# Patient Record
Sex: FEMALE | Race: WHITE | Employment: UNEMPLOYED | URBAN - METROPOLITAN AREA
[De-identification: names, ages, dates, MRNs, and addresses within clinical notes are randomized per-mention and may not be internally consistent; named-entity substitution may affect disease eponyms.]

---

## 2018-01-19 PROCEDURE — 88175 CYTOPATH C/V AUTO FLUID REDO: CPT | Performed by: OBSTETRICS & GYNECOLOGY

## 2018-01-19 PROCEDURE — 87624 HPV HI-RISK TYP POOLED RSLT: CPT | Performed by: OBSTETRICS & GYNECOLOGY

## 2019-02-08 PROCEDURE — 88305 TISSUE EXAM BY PATHOLOGIST: CPT | Performed by: OBSTETRICS & GYNECOLOGY

## 2019-06-15 ENCOUNTER — OFFICE VISIT (OUTPATIENT)
Dept: FAMILY MEDICINE CLINIC | Facility: CLINIC | Age: 44
End: 2019-06-15
Payer: COMMERCIAL

## 2019-06-15 VITALS
RESPIRATION RATE: 16 BRPM | HEIGHT: 66 IN | BODY MASS INDEX: 25.71 KG/M2 | OXYGEN SATURATION: 98 % | DIASTOLIC BLOOD PRESSURE: 82 MMHG | WEIGHT: 160 LBS | TEMPERATURE: 99 F | SYSTOLIC BLOOD PRESSURE: 118 MMHG | HEART RATE: 82 BPM

## 2019-06-15 DIAGNOSIS — J01.00 ACUTE NON-RECURRENT MAXILLARY SINUSITIS: Primary | ICD-10-CM

## 2019-06-15 DIAGNOSIS — J45.21 MILD INTERMITTENT ASTHMA WITH EXACERBATION: ICD-10-CM

## 2019-06-15 PROCEDURE — 99202 OFFICE O/P NEW SF 15 MIN: CPT | Performed by: PHYSICIAN ASSISTANT

## 2019-06-15 RX ORDER — DEXTROMETHORPHAN HYDROBROMIDE AND PROMETHAZINE HYDROCHLORIDE 15; 6.25 MG/5ML; MG/5ML
5 SYRUP ORAL 4 TIMES DAILY PRN
Qty: 118 ML | Refills: 0 | Status: SHIPPED | OUTPATIENT
Start: 2019-06-15 | End: 2020-02-04 | Stop reason: ALTCHOICE

## 2019-06-15 RX ORDER — AMOXICILLIN AND CLAVULANATE POTASSIUM 875; 125 MG/1; MG/1
1 TABLET, FILM COATED ORAL 2 TIMES DAILY
Qty: 14 TABLET | Refills: 0 | Status: SHIPPED | OUTPATIENT
Start: 2019-06-15 | End: 2019-06-22

## 2019-06-15 RX ORDER — PREDNISONE 20 MG/1
40 TABLET ORAL DAILY
Qty: 10 TABLET | Refills: 0 | Status: SHIPPED | OUTPATIENT
Start: 2019-06-15 | End: 2019-06-20

## 2019-06-15 NOTE — PROGRESS NOTES
Patient presents with:  Sinus Problem: x 2 weeks, worsening deep cough cough, asthma flare up x hx of asthma     HPI:   Isis Woodward is a 53year old female who presents for sinus congestion and cough for  2  weeks.  Symptoms were initially improving but • Recurrent genital HSV (herpes simplex virus) infection       Social History:  Social History    Tobacco Use      Smoking status: Never Smoker      Smokeless tobacco: Never Used    Alcohol use: No    Drug use: No         REVIEW OF SYSTEMS:   GENERAL:See if no improvement in 3-5 days or sooner if sx worsen. The patient indicates understanding of these issues and agrees to the plan.     Requested Prescriptions     Signed Prescriptions Disp Refills   • predniSONE 20 MG Oral Tab 10 tablet 0     Sig: Take 2 ta or kidney disease or ever had a stomach ulcer, talk with your healthcare provider before using these medicines. · Over the counter Delsym or cough drops may help with your cough. Cepacol lozenges can soothe your throat.   For coughing at night, sleep wit problems  · Symptoms don't go away in 10 days

## 2019-06-15 NOTE — PATIENT INSTRUCTIONS
Please follow up with your PCP if no improvement within 5-7 days. Go directly to the ER for any acute worsening of symptoms. · Rest.  Drink lots of fluids. · Complete the steroid pack as directed.   · Use the inhaler as needed for cough or wheeze  · Muci prescription. · If you develop a rash, hives, itching, throat tightness, or shortness of breath while on the antibiotic or shortly after completion, please call your PCP immediately and stop your antibiotic. This may be an allergic reaction.    · If you w

## 2020-02-04 ENCOUNTER — OFFICE VISIT (OUTPATIENT)
Dept: FAMILY MEDICINE CLINIC | Facility: CLINIC | Age: 45
End: 2020-02-04
Payer: COMMERCIAL

## 2020-02-04 VITALS
SYSTOLIC BLOOD PRESSURE: 120 MMHG | RESPIRATION RATE: 16 BRPM | DIASTOLIC BLOOD PRESSURE: 78 MMHG | OXYGEN SATURATION: 97 % | TEMPERATURE: 99 F | HEART RATE: 72 BPM

## 2020-02-04 DIAGNOSIS — J01.40 ACUTE NON-RECURRENT PANSINUSITIS: Primary | ICD-10-CM

## 2020-02-04 DIAGNOSIS — J45.21 MILD INTERMITTENT ASTHMA WITH EXACERBATION: ICD-10-CM

## 2020-02-04 PROCEDURE — 99213 OFFICE O/P EST LOW 20 MIN: CPT | Performed by: NURSE PRACTITIONER

## 2020-02-04 RX ORDER — METHYLPREDNISOLONE 4 MG/1
TABLET ORAL
Qty: 21 TABLET | Refills: 0 | Status: SHIPPED | OUTPATIENT
Start: 2020-02-04 | End: 2021-02-19

## 2020-02-04 RX ORDER — AMOXICILLIN AND CLAVULANATE POTASSIUM 875; 125 MG/1; MG/1
1 TABLET, FILM COATED ORAL 2 TIMES DAILY
Qty: 20 TABLET | Refills: 0 | Status: SHIPPED | OUTPATIENT
Start: 2020-02-04 | End: 2020-02-14

## 2020-02-04 NOTE — PROGRESS NOTES
CHIEF COMPLAINT:   \" Patient presents with:  Sinus Problem  Asthma   \"  HPI:   Ganesh Cowan is a 50year old female who presents with a hx of Frontal and Maxillary sinus congestion and pressure and a persistent cough with coughing jags.    Pt has been simplex virus) infection       Past Surgical History:   Procedure Laterality Date   •      • COLONOSCOPY      neg melena IBS      Family History   Problem Relation Age of Onset   • Diabetes Father    • Hypertension Father    • High Cholesterol inspiratory effort. CARDIO: RRR without murmur  LYMPH:  No head/neck lymphadenopathy. ASSESSMENT AND PLAN:   Chevy Benitez is a 50year old female who presents with     1.  Acute non-recurrent pansinusitis    - Amoxicillin-Pot Clavulanate 875-125 MG